# Patient Record
(demographics unavailable — no encounter records)

---

## 2024-10-23 NOTE — DISCUSSION/SUMMARY
[FreeTextEntry1] : Ms. CUCO PEÑA is a 66 year female with known DM2, HLD, HTN and obesity. Establishing care. At the present time She is completely asymptomatic. I have recommended to continue the same medications. I also recommended lifestyle modification with weight loss and exercise. We will perform an echocardiogram to assess left ventricular and valvular function. We will perform routine laboratory tests Routine follow up in 6 months [EKG obtained to assist in diagnosis and management of assessed problem(s)] : EKG obtained to assist in diagnosis and management of assessed problem(s)

## 2024-10-23 NOTE — REASON FOR VISIT
[Symptom and Test Evaluation] : symptom and test evaluation [FreeTextEntry1] : Establishing care for tachycardia

## 2024-10-23 NOTE — HISTORY OF PRESENT ILLNESS
[FreeTextEntry1] : 65 yo woman, , mother of two. Retired from . Establishing care for resting tachycardia that has been treated with metoprolol since 2019. Was seen by Dr. Shrestha and underwent multiple testing at the time that she states were negative. Has COLTON under the care of Dr. Fernando. No asthma or COPD.  At the present time patient is completely asymptomatic and denies CP, SOB, orthopnea or PND. Denies palpitations, dizziness or ankle swelling. Very active in the gym 4 x week. Diabetes Type 2. since age 47. Treated with meds. HbA1c= 6.3%. treated with Mounjaro.  HTN treated with meds since age 46 HLD treated with statins Never smoked. Occasional marijuana. Occasional alcohol Denies the use of illicit drugs.

## 2024-10-23 NOTE — ASSESSMENT
[FreeTextEntry1] : EKG performed today at the office revealed a NSR, with normal AQRS, OK, QRS and QTc.

## 2024-10-23 NOTE — PHYSICAL EXAM
Consider [employer] ([email or web address]/) $ DO NOT SUBSCRIBE WE WILL ALWAYS REVIEW YOUR TRACINGS ON [employer]HART or Smartwatch such as Apple Watch $$$$ for monitoring of the heart palpitations.  This is a small device that syncs to your phone with Bluetooth that can tell you your heart rhythm.  You can send us a screencapture of the tracings with [employer].        Typical Patch Monitor looks like this [employer] or [employer]ixsunita      Alternatively consider a pulse oximeter and let us know if Oxygen is <90 or pulse is <50 or > 110 while resting       [Well Developed] : well developed [Well Nourished] : well nourished [No Acute Distress] : no acute distress [Normal Conjunctiva] : normal conjunctiva [Normal Venous Pressure] : normal venous pressure [No Carotid Bruit] : no carotid bruit [Normal S1, S2] : normal S1, S2 [No Murmur] : no murmur [No Rub] : no rub [No Gallop] : no gallop [Clear Lung Fields] : clear lung fields [Good Air Entry] : good air entry [No Respiratory Distress] : no respiratory distress  [Soft] : abdomen soft [Non Tender] : non-tender [No Masses/organomegaly] : no masses/organomegaly [Normal Bowel Sounds] : normal bowel sounds [Normal Gait] : normal gait [No Edema] : no edema [No Cyanosis] : no cyanosis [No Clubbing] : no clubbing [No Varicosities] : no varicosities [No Rash] : no rash [No Skin Lesions] : no skin lesions [Moves all extremities] : moves all extremities [No Focal Deficits] : no focal deficits [Normal Speech] : normal speech [Alert and Oriented] : alert and oriented [Normal memory] : normal memory

## 2024-11-20 NOTE — HISTORY OF PRESENT ILLNESS
[Obstructive Sleep Apnea] : obstructive sleep apnea [Awakes Unrefreshed] : awakes unrefreshed [Awakes with Dry Mouth] : awakes with dry mouth [Daytime Somnolence] : daytime somnolence [Nonrestorative Sleep] : nonrestorative sleep [Recent  Weight Gain] : recent weight gain [Snoring] : snoring Sent from Dr. Jordan's office  Hb 5.0 on admission  Pt complaining of mild nausea and headache  Baseline approx 5-6  s/p 2 U PRBC in ED  s/p heparin + brilinta in ED    -f/u repeat cbc  -maintain Hb >5, or transfuse if symptomatic  -Dr. Jordan/Dr. Pan on board, appreciated  -cont folate [FreeTextEntry1] : 3/20/2020 59 yo female hospitalized at [facility] from 5/6-5/7/19 for neurocardiogenic syncope referred due to need for management of COLTON on CPAP. Echo, Carotid ultrasound, and telemetry monitoring were normal Diagnosed with COLTON in about 2003 On nasal cpap at 9 cm H20 via an old S8 machine No coverage for supplies - buys online Snoring despite CPAP at this point Never sleeps without it.   9/19/23 64 y.o female Compliant with and benefiting from nocturnal CPAP Last seen in March of 2020 Current  8/16/2019 Lost 35 lbs since last seen Now covered under [employer] Leaving for [facility] in 2 days - then going to [facility] back in 6 weeks   11/29/23 Sleeping OK without CPAP Back pain=> now a back sleeper - side sleeper before  1/3/24 Compliant with and benefiting from nocturnal CPAP  11/20/24 Compliant with and benefiting from nocturnal CPAP Old CPAP - set up 8/16/2019 Failing    [ESS] : 6 Sent from Dr. Jordan's office  Hb 5.0 on admission  Pt complaining of mild nausea and headache  Baseline approx 5-6  s/p 2 U PRBC in ED    -f/u repeat cbc  -maintain Hb >5, or transfuse if symptomatic  -Dr. Jordan/Dr. Pan on board, appreciated  -cont folate Sent from Dr. Jordan's office  Hb 5.0 on admission  Pt complaining of mild nausea and headache  Baseline Hb approx 5-6  s/p 2 U PRBC in ED    -f/u repeat cbc post transfusion  -maintain Hb >5, or transfuse if symptomatic  -Dr. Jordan/Dr. Pan on board, appreciated  -cont folate

## 2024-11-20 NOTE — RESULTS/DATA
[TextEntry] : WP HST on 11/15/23: AHI=16.5; REM AHI=41; Supine AHI=26.5; RDI=18.1 Compliance for 12/21/23-1/1/24=83% Compliance for 10/19-11/17/24=87%

## 2024-11-20 NOTE — DISCUSSION/SUMMARY
[FreeTextEntry1] : Assess  Obesity Severe COLTON Compliant with and benefiting from nocturnal AutoPap CPAP failing  Plan  Weight loss New APAP via DW nasal mask small interface and small frame 6-month FU

## 2024-11-20 NOTE — PHYSICAL EXAM
[Low Lying Soft Palate] : low lying soft palate [Enlarged Base of the Tongue] : enlargement of the base of the tongue [II] : II [Neck Appearance] : the appearance of the neck was normal [] : the neck was supple [Neck Cervical Mass (___cm)] : no neck mass was observed [Jugular Venous Distention Increased] : there was no jugular-venous distention [Thyroid Diffuse Enlargement] : the thyroid was not enlarged [Neck Circumference: ___] : neck circumference is [unfilled] [Heart Sounds] : normal S1 and S2 [Arterial Pulses Normal] : the arterial pulses were normal [Edema] : no peripheral edema present [Auscultation Breath Sounds / Voice Sounds] : lungs were clear to auscultation bilaterally [Respiration, Rhythm And Depth] : normal respiratory rhythm and effort [Lungs Percussion] : the lungs were normal to percussion [Nail Clubbing] : no clubbing of the fingernails [Cyanosis, Localized] : no localized cyanosis [FreeTextEntry1] : obese

## 2025-03-05 NOTE — HISTORY OF PRESENT ILLNESS
[Obstructive Sleep Apnea] : obstructive sleep apnea [Awakes Unrefreshed] : awakes unrefreshed [Awakes with Dry Mouth] : awakes with dry mouth [Daytime Somnolence] : daytime somnolence [Nonrestorative Sleep] : nonrestorative sleep [Recent  Weight Gain] : recent weight gain [Snoring] : snoring [FreeTextEntry1] : 3/20/2020 59 yo female hospitalized at [facility] from 5/6-5/7/19 for neurocardiogenic syncope referred due to need for management of COLTON on CPAP. Echo, Carotid ultrasound, and telemetry monitoring were normal Diagnosed with COLTON in about 2003 On nasal cpap at 9 cm H20 via an old S8 machine No coverage for supplies - buys online Snoring despite CPAP at this point Never sleeps without it.   9/19/23 64 y.o female Compliant with and benefiting from nocturnal CPAP Last seen in March of 2020 Current  8/16/2019 Lost 35 lbs since last seen Now covered under [facility] Leaving for [facility] in 2 days - then going to [facility] - back in 6 weeks   11/29/23 Sleeping OK without CPAP Back pain=> now a back sleeper - side sleeper before  1/3/24 Compliant with and benefiting from nocturnal CPAP  11/20/24 Compliant with and benefiting from nocturnal CPAP Old CPAP - set up 8/16/2019 Failing   3/5/25 Compliant with and benefiting from nocturnal CPAP Set up 11/29/24 - [employer] Small N30 nasal mask - slides too much Will try AT N30i Now covered under Medicare     [ESS] : 6

## 2025-03-05 NOTE — PHYSICAL EXAM
[Low Lying Soft Palate] : low lying soft palate [Enlarged Base of the Tongue] : enlargement of the base of the tongue [II] : II [Neck Appearance] : the appearance of the neck was normal [] : the neck was supple [Neck Cervical Mass (___cm)] : no neck mass was observed [Jugular Venous Distention Increased] : there was no jugular-venous distention [Thyroid Diffuse Enlargement] : the thyroid was not enlarged [Neck Circumference: ___] : neck circumference is [unfilled] [Heart Sounds] : normal S1 and S2 [Arterial Pulses Normal] : the arterial pulses were normal [Edema] : no peripheral edema present [Respiration, Rhythm And Depth] : normal respiratory rhythm and effort [Auscultation Breath Sounds / Voice Sounds] : lungs were clear to auscultation bilaterally [Lungs Percussion] : the lungs were normal to percussion [Nail Clubbing] : no clubbing of the fingernails [Cyanosis, Localized] : no localized cyanosis [FreeTextEntry1] : obese

## 2025-03-05 NOTE — DISCUSSION/SUMMARY
[FreeTextEntry1] : Assess  Obesity Severe COLTON Compliant with and benefiting from nocturnal AutoPap  Plan  Weight loss APAP via small AirTouch N30i mask 12-month FU